# Patient Record
Sex: FEMALE | Race: WHITE | NOT HISPANIC OR LATINO | Employment: FULL TIME | URBAN - METROPOLITAN AREA
[De-identification: names, ages, dates, MRNs, and addresses within clinical notes are randomized per-mention and may not be internally consistent; named-entity substitution may affect disease eponyms.]

---

## 2020-10-14 RX ORDER — LISDEXAMFETAMINE DIMESYLATE 50 MG/1
50 CAPSULE ORAL EVERY MORNING
COMMUNITY
End: 2021-10-21

## 2020-10-14 NOTE — PROGRESS NOTES
CC: Well Woman (age 40+ NOT menopausal)    HPI:  Patient is a 44 y.o.   who presents for her well woman exam today.  History reviewed with patient and changes noted. Patient is without complaints or concerns today.       Past Medical History:   Diagnosis Date    ADD (attention deficit disorder)     Chronic constipation     History of abnormal cervical Pap smear     cryo - normal since    Low vitamin D level        Past Surgical History:   Procedure Laterality Date    CERVIX LESION DESTRUCTION         Social History     Tobacco Use    Smoking status: Never Smoker   Substance Use Topics    Alcohol use: Yes     Alcohol/week: 3.0 standard drinks     Types: 3 Glasses of wine per week     Comment: weekly    Drug use: Never       Family History   Problem Relation Age of Onset    Hypertension Mother     Diabetes Mother     Breast cancer Paternal Aunt        Review of patient's allergies indicates:  No Known Allergies      Current Outpatient Medications:     lisdexamfetamine (VYVANSE) 50 MG capsule, Take 50 mg by mouth every morning., Disp: , Rfl:     OB History        3    Para   3    Term   3            AB        Living   3       SAB        TAB        Ectopic        Multiple        Live Births   3                  GYN HX:    HX ABNL PAPS:  yes- had cryo yrs ago but normal since    HX OF STDS:  none    GARDASIL:  no- declines     CONTRACEPTION:  vasectomy      HEALTH MAINTENANCE:  ([facility])    LAST ANNUAL/ PAP:   2019      LAST PAP:  neg    LAST MMG:  2019  normal at [facility]      LAST LABS: more than a year ago      Review of Systems   Constitutional: Negative for activity change, appetite change, chills, fatigue, fever and unexpected weight change.   Respiratory: Negative for cough and shortness of breath.    Cardiovascular: Negative for chest pain and leg swelling.   Gastrointestinal: Negative for abdominal pain, bloating, blood in  "stool, constipation, diarrhea, nausea and vomiting.   Endocrine: Negative for hair loss and hot flashes.   Genitourinary: Negative for decreased libido, dysmenorrhea, dyspareunia, dysuria, flank pain, frequency, genital sores, hematuria, menorrhagia, pelvic pain, urgency, vaginal bleeding, vaginal discharge, vaginal pain, urinary incontinence, postcoital bleeding and vaginal odor.   Musculoskeletal: Negative for arthralgias and joint swelling.   Integumentary:  Negative for rash, acne, mole/lesion, breast mass, nipple discharge, breast skin changes and breast tenderness.   Neurological: Negative for headaches.   Hematological: Does not bruise/bleed easily.   Psychiatric/Behavioral: Negative for depression and sleep disturbance. The patient is not nervous/anxious.    Breast: Negative for asymmetry, lump, mass, nipple discharge, skin changes and tenderness      VITALS:  Patient's last menstrual period was 09/15/2020.  Vitals:    10/15/20 0847   BP: 101/62   Pulse: 92   Weight: 72.3 kg (159 lb 6.4 oz)   Height: 5' 7" (1.702 m)     Body mass index is 24.97 kg/m².     PHYSICAL EXAM:  Physical Exam:   Constitutional: She is oriented to person, place, and time. She appears well-developed and well-nourished. No distress.    HENT:   Head: Normocephalic and atraumatic.     Neck: No thyroid mass present.    Cardiovascular: Normal rate and normal pulses.     Pulmonary/Chest: Effort normal. No respiratory distress. She exhibits no deformity. Right breast exhibits no inverted nipple, no mass, no nipple discharge, no skin change, no tenderness, no bleeding and no swelling. Left breast exhibits no inverted nipple, no mass, no nipple discharge, no skin change, no tenderness, no bleeding and no swelling. Breasts are symmetrical.        Abdominal: Soft. Normal appearance. She exhibits no distension. There is no abdominal tenderness.     Genitourinary:    Vagina and uterus normal.      Pelvic exam was performed with patient supine. "   There is no rash, tenderness, lesion or injury on the right labia. There is no rash, tenderness, lesion or injury on the left labia. Cervix is normal. Right adnexum displays no mass, no tenderness and no fullness. Left adnexum displays no mass, no tenderness and no fullness. No rectocele, cystocele or unspecified prolapse of vaginal walls in the vagina. Labial bartholins normal.negative for vaginal discharge          Musculoskeletal: Moves all extremeties.       Neurological: She is alert and oriented to person, place, and time.    Skin: Skin is warm and dry. No lesion and no rash noted.    Psychiatric: She has a normal mood and affect. Her speech is normal and behavior is normal. Judgment and thought content normal.     *female chaperone present for exam    ASSESSMENT and PLAN:  Encounter for well woman exam with routine gynecological exam  -     Liquid-based pap smear, screening    Breast cancer screening by mammogram  -     Mammo Digital Screening Bilat w/ Vaughn; Future; Expected date: 11/14/2020         FOLLOWUP:  Follow up in about 1 year (around 10/15/2021).     COUNSELING:  Patient was counseled today on A.C.S. Pap guidelines and recommendations for yearly pelvic exam, monthly self breast exams, contraception, annual mammograms, and screening colonoscopy starting at age 50. Encouraged patient to see her PCP for other health maintenance.

## 2020-10-15 ENCOUNTER — OFFICE VISIT (OUTPATIENT)
Dept: OBSTETRICS AND GYNECOLOGY | Facility: CLINIC | Age: 45
End: 2020-10-15
Payer: COMMERCIAL

## 2020-10-15 VITALS
HEIGHT: 67 IN | BODY MASS INDEX: 25.01 KG/M2 | HEART RATE: 92 BPM | SYSTOLIC BLOOD PRESSURE: 101 MMHG | DIASTOLIC BLOOD PRESSURE: 62 MMHG | WEIGHT: 159.38 LBS

## 2020-10-15 DIAGNOSIS — Z01.419 ENCOUNTER FOR WELL WOMAN EXAM WITH ROUTINE GYNECOLOGICAL EXAM: Primary | ICD-10-CM

## 2020-10-15 DIAGNOSIS — Z12.31 BREAST CANCER SCREENING BY MAMMOGRAM: ICD-10-CM

## 2020-10-15 PROCEDURE — 99396 PREV VISIT EST AGE 40-64: CPT | Mod: S$GLB,,, | Performed by: OBSTETRICS & GYNECOLOGY

## 2020-10-15 PROCEDURE — 99396 PR PREVENTIVE VISIT,EST,40-64: ICD-10-PCS | Mod: S$GLB,,, | Performed by: OBSTETRICS & GYNECOLOGY

## 2020-11-20 ENCOUNTER — TELEPHONE (OUTPATIENT)
Dept: OBSTETRICS AND GYNECOLOGY | Facility: CLINIC | Age: 45
End: 2020-11-20

## 2020-11-20 NOTE — TELEPHONE ENCOUNTER
----- Message from Sayra Mohr sent at 11/20/2020 11:30 AM CST -----  Regarding: pt  Pt would like to speak with Josie. Please call back at [phone]

## 2020-11-22 ENCOUNTER — TELEPHONE (OUTPATIENT)
Dept: OBSTETRICS AND GYNECOLOGY | Facility: CLINIC | Age: 45
End: 2020-11-22

## 2020-11-22 NOTE — TELEPHONE ENCOUNTER
Please call pt and let her know that her pap was normal. The interface to [facility] was down at that time so they were not loaded into her chart yet. We were makinig sure all the ones we sent got results back but hadnt called back normals as they were supposed to be coming thru the interface at some point. But we have manually scanned her report into her chart now so if she goes onto the portal she should be able to see that report if she wants

## 2020-11-23 ENCOUNTER — TELEPHONE (OUTPATIENT)
Dept: OBSTETRICS AND GYNECOLOGY | Facility: CLINIC | Age: 45
End: 2020-11-23

## 2020-11-23 NOTE — TELEPHONE ENCOUNTER
----- Message from Etelvina Wray MD sent at 11/22/2020 10:01 AM CST -----  Regarding: results- nl pap  Please let pat know her pap results are normal

## 2020-12-14 ENCOUNTER — TELEPHONE (OUTPATIENT)
Dept: OBSTETRICS AND GYNECOLOGY | Facility: CLINIC | Age: 45
End: 2020-12-14

## 2020-12-14 DIAGNOSIS — R30.0 DYSURIA: Primary | ICD-10-CM

## 2020-12-14 RX ORDER — NITROFURANTOIN 25; 75 MG/1; MG/1
100 CAPSULE ORAL 2 TIMES DAILY
Qty: 14 CAPSULE | Refills: 0 | Status: SHIPPED | OUTPATIENT
Start: 2020-12-14 | End: 2020-12-21

## 2020-12-14 NOTE — TELEPHONE ENCOUNTER
----- Message from Zena Munoz sent at 12/14/2020 12:32 PM CST -----  Patient need to speak to Josie regarding calling out medication for UTI. Call back number [phone]. Tks

## 2020-12-14 NOTE — TELEPHONE ENCOUNTER
Spoke with patient. Two pt identifiers confirmed. Pt requesting a rx for a uti. Symptoms of frequent ua and burning when emptying. Will send ua ,c&s order to [facility]. [facility] and uses [facility] [address]. Will fwd to Dr. Wray. Check pharmacy later this afternoon. Patient verbalized understanding.

## 2020-12-14 NOTE — TELEPHONE ENCOUNTER
----- Message from Tomas Goff sent at 12/14/2020  2:01 PM CST -----  Regarding: Return call  Type:  Patient Returning Call    Who Called:Teresa  Who Left Message for Patient:Josie  Does the patient know what this is regarding?:na  Would the patient rather a call back or a response via [facility]? Call back  Best Call Back Number:[phone] (home)   Additional Information: margarita

## 2020-12-14 NOTE — TELEPHONE ENCOUNTER
----- Message from Tomas Goff sent at 12/14/2020  2:01 PM CST -----  Regarding: Return call  Type:  Patient Returning Call    Who Called:Teresa  Who Left Message for Patient:Josie  Does the patient know what this is regarding?:na  Would the patient rather a call back or a response via [facility]? Call back  Best Call Back Number:[phone] (home)   Additional Information: margarita

## 2021-10-18 ENCOUNTER — TELEPHONE (OUTPATIENT)
Dept: OBSTETRICS AND GYNECOLOGY | Facility: CLINIC | Age: 46
End: 2021-10-18

## 2021-10-21 ENCOUNTER — OFFICE VISIT (OUTPATIENT)
Dept: OBSTETRICS AND GYNECOLOGY | Facility: CLINIC | Age: 46
End: 2021-10-21
Payer: COMMERCIAL

## 2021-10-21 VITALS
DIASTOLIC BLOOD PRESSURE: 71 MMHG | HEIGHT: 67 IN | HEART RATE: 80 BPM | BODY MASS INDEX: 25.58 KG/M2 | SYSTOLIC BLOOD PRESSURE: 109 MMHG | WEIGHT: 163 LBS

## 2021-10-21 DIAGNOSIS — Z12.31 BREAST CANCER SCREENING BY MAMMOGRAM: Primary | ICD-10-CM

## 2021-10-21 DIAGNOSIS — Z01.419 ENCOUNTER FOR WELL WOMAN EXAM WITH ROUTINE GYNECOLOGICAL EXAM: ICD-10-CM

## 2021-10-21 DIAGNOSIS — Z12.11 SCREENING FOR COLON CANCER: ICD-10-CM

## 2021-10-21 PROCEDURE — 99396 PREV VISIT EST AGE 40-64: CPT | Mod: S$GLB,,, | Performed by: OBSTETRICS & GYNECOLOGY

## 2021-10-21 PROCEDURE — 99396 PR PREVENTIVE VISIT,EST,40-64: ICD-10-PCS | Mod: S$GLB,,, | Performed by: OBSTETRICS & GYNECOLOGY

## 2021-10-28 ENCOUNTER — TELEPHONE (OUTPATIENT)
Dept: OBSTETRICS AND GYNECOLOGY | Facility: CLINIC | Age: 46
End: 2021-10-28
Payer: COMMERCIAL

## 2022-01-06 ENCOUNTER — TELEPHONE (OUTPATIENT)
Dept: GASTROENTEROLOGY | Facility: CLINIC | Age: 47
End: 2022-01-06
Payer: COMMERCIAL

## 2022-10-26 NOTE — PROGRESS NOTES
CC: WELL WOMAN (age 45-59)-perimenopausal  Patient Care Team:  [facility] as PCP - General    HPI:  Patient is a 46 y.o. who presents for her well woman exam today.  History reviewed with patient.   Patient is without complaints or concerns today. Periods still reg q mo    The patient's last visit with me was on 10/21/2021 for wwe    CONTRACEPTION: vasectomy  HX ABNL PAPS: normal after cryo -    REVIEW OF PRIOR DATA/ HEALTH MAINTENANCE:  LAST ANNUAL:   2021   LAST MMG (screening)- 2022- normal at [facility]   LAST LABS- up to date with PCP    LAST COLONOSCOPY- never -declines until age 50- reviewed current guidelines that have have changed to 45-pt aware and will defer till 50     Past Medical History:   Diagnosis Date    ADD (attention deficit disorder)     Chronic constipation     History of abnormal cervical Pap smear     cryo - normal since    Low vitamin D level      SURGICAL HX:   has a past surgical history that includes Cervix lesion destruction ().    SOCIAL HX:    reports that she has never smoked. She has never used smokeless tobacco. She reports current alcohol use of about 3.0 standard drinks per week. She reports that she does not use drugs.    FAMILY HX:   family history includes Breast cancer in her paternal aunt; Diabetes in her mother; Hypertension in her mother. .    ALLERGIES:  Patient has no known allergies.    Current Outpatient Medications:     dextroamphetamine-amphetamine (ADDERALL XR) 30 MG 24 hr capsule, , Disp: , Rfl:     ROS:  CONST:  No fever, chills, fatigue or unexpected changes in weight  CV: No chest pain or palpitations  RESP:  No shortness of breath or cough  GI: No abd pain, vomiting, diarrhea, blood in stool, or changes in bowel mvmts  SKIN: No rashes or lesions  MUSCULOSKELETAL: No joint swelling or pain  PSYCH: No changes in mood or insomia  BREASTS: No asymmetry, lumps, pain, nipple discharge, or skin changes  :  No  "dysuria, urgency, frequency, hematuria or incontinence          No vag dc, itching, odor or dryness          No pelvic pain, dyspareunia, or abnormal vaginal bleeding    VITALS:  Blood pressure 115/83, pulse 71, height 5' 7" (1.702 m), weight 75.3 kg (166 lb), last menstrual period 10/22/2022.  Body mass index is 26 kg/m².     PHYSICAL EXAM-  APPEARANCE: Well appearing, in no acute distress.  NECK: Neck symmetric without masses or thyromegaly.  CV:  Normal rate  PULM: Normal resp rate, no resp distress, normal resp effort  PSYCH: Normal mood and affect, cooperative  SKIN: No rashes, lesions, or abnormal bruising  LYMPH: No inguinal or axillary adenopathy  ABD: Soft, without tenderness or masses. No palpable hernias or hsm  BREAST: Symmetrical, no nipple changes, no skin changes, No palpable masses   PELVIC:  VULVA: No lesions. Normal female genitalia.  URETHRAL MEATUS: No masses, no prolapse.  BLADDER/ URETHRA: No masses or suprapubic tenderness  VAGINA: No lesions or erythema, No discharge.  CVX: No lesions,no cervical motion tenderness.   UTERUS: Normal size/shape, mobile, non-tender  ADNEXA: No masses, tenderness, or fullness.  ANUS/ PERINEUM: Normal tone.  No lesions.    *female chaperone present for entire exam      ASSESSMENT and PLAN:  Encounter for well woman exam with routine gynecological exam  -     Liquid-based pap smear, screening    Breast cancer screening by mammogram  -     Mammo Digital Screening Bilat w/ Vaughn; Future; Expected date: 11/09/2022    Screening for colon cancer     FOLLOWUP:  1 year for wwe or sooner prn    COUNSELING:  Patient was counseled today on recommendation for yearly pelvic exam, current Pap guidelines, self breast exams, contraception, recommendations for annual screening mammograms, and routine screening colonoscopy at age 45.  Encouraged patient to see her PCP for other health maintenance.         "

## 2022-10-27 ENCOUNTER — OFFICE VISIT (OUTPATIENT)
Dept: OBSTETRICS AND GYNECOLOGY | Facility: CLINIC | Age: 47
End: 2022-10-27
Payer: COMMERCIAL

## 2022-10-27 VITALS
DIASTOLIC BLOOD PRESSURE: 83 MMHG | HEIGHT: 67 IN | WEIGHT: 166 LBS | BODY MASS INDEX: 26.06 KG/M2 | HEART RATE: 71 BPM | SYSTOLIC BLOOD PRESSURE: 115 MMHG

## 2022-10-27 DIAGNOSIS — Z12.11 SCREENING FOR COLON CANCER: ICD-10-CM

## 2022-10-27 DIAGNOSIS — Z01.419 ENCOUNTER FOR WELL WOMAN EXAM WITH ROUTINE GYNECOLOGICAL EXAM: Primary | ICD-10-CM

## 2022-10-27 DIAGNOSIS — Z12.31 BREAST CANCER SCREENING BY MAMMOGRAM: ICD-10-CM

## 2022-10-27 PROCEDURE — 99396 PREV VISIT EST AGE 40-64: CPT | Mod: S$GLB,,, | Performed by: OBSTETRICS & GYNECOLOGY

## 2022-10-27 PROCEDURE — 3079F DIAST BP 80-89 MM HG: CPT | Mod: CPTII,S$GLB,, | Performed by: OBSTETRICS & GYNECOLOGY

## 2022-10-27 PROCEDURE — 3074F SYST BP LT 130 MM HG: CPT | Mod: CPTII,S$GLB,, | Performed by: OBSTETRICS & GYNECOLOGY

## 2022-10-27 PROCEDURE — 3074F PR MOST RECENT SYSTOLIC BLOOD PRESSURE < 130 MM HG: ICD-10-PCS | Mod: CPTII,S$GLB,, | Performed by: OBSTETRICS & GYNECOLOGY

## 2022-10-27 PROCEDURE — 1160F PR REVIEW ALL MEDS BY PRESCRIBER/CLIN PHARMACIST DOCUMENTED: ICD-10-PCS | Mod: CPTII,S$GLB,, | Performed by: OBSTETRICS & GYNECOLOGY

## 2022-10-27 PROCEDURE — 3079F PR MOST RECENT DIASTOLIC BLOOD PRESSURE 80-89 MM HG: ICD-10-PCS | Mod: CPTII,S$GLB,, | Performed by: OBSTETRICS & GYNECOLOGY

## 2022-10-27 PROCEDURE — 1159F MED LIST DOCD IN RCRD: CPT | Mod: CPTII,S$GLB,, | Performed by: OBSTETRICS & GYNECOLOGY

## 2022-10-27 PROCEDURE — 1160F RVW MEDS BY RX/DR IN RCRD: CPT | Mod: CPTII,S$GLB,, | Performed by: OBSTETRICS & GYNECOLOGY

## 2022-10-27 PROCEDURE — 99396 PR PREVENTIVE VISIT,EST,40-64: ICD-10-PCS | Mod: S$GLB,,, | Performed by: OBSTETRICS & GYNECOLOGY

## 2022-10-27 PROCEDURE — 1159F PR MEDICATION LIST DOCUMENTED IN MEDICAL RECORD: ICD-10-PCS | Mod: CPTII,S$GLB,, | Performed by: OBSTETRICS & GYNECOLOGY

## 2022-10-31 LAB — Lab: NORMAL

## 2023-09-29 ENCOUNTER — PATIENT MESSAGE (OUTPATIENT)
Dept: OBSTETRICS AND GYNECOLOGY | Facility: CLINIC | Age: 48
End: 2023-09-29
Payer: COMMERCIAL

## 2025-02-25 ENCOUNTER — OFFICE VISIT (OUTPATIENT)
Dept: OBSTETRICS AND GYNECOLOGY | Facility: CLINIC | Age: 50
End: 2025-02-25
Payer: COMMERCIAL

## 2025-02-25 VITALS
SYSTOLIC BLOOD PRESSURE: 110 MMHG | DIASTOLIC BLOOD PRESSURE: 73 MMHG | HEART RATE: 90 BPM | HEIGHT: 67 IN | WEIGHT: 175 LBS | BODY MASS INDEX: 27.47 KG/M2

## 2025-02-25 DIAGNOSIS — Z12.4 ENCOUNTER FOR SCREENING FOR CERVICAL CANCER: ICD-10-CM

## 2025-02-25 DIAGNOSIS — Z12.11 COLON CANCER SCREENING: ICD-10-CM

## 2025-02-25 DIAGNOSIS — Z12.31 SCREENING MAMMOGRAM FOR BREAST CANCER: ICD-10-CM

## 2025-02-25 DIAGNOSIS — Z01.419 GYNECOLOGIC EXAM NORMAL: Primary | ICD-10-CM

## 2025-02-25 PROCEDURE — 3008F BODY MASS INDEX DOCD: CPT | Mod: CPTII,,, | Performed by: NURSE PRACTITIONER

## 2025-02-25 PROCEDURE — 1159F MED LIST DOCD IN RCRD: CPT | Mod: CPTII,,, | Performed by: NURSE PRACTITIONER

## 2025-02-25 PROCEDURE — 99396 PREV VISIT EST AGE 40-64: CPT | Mod: S$PBB,,, | Performed by: NURSE PRACTITIONER

## 2025-02-25 PROCEDURE — 3078F DIAST BP <80 MM HG: CPT | Mod: CPTII,,, | Performed by: NURSE PRACTITIONER

## 2025-02-25 PROCEDURE — 1160F RVW MEDS BY RX/DR IN RCRD: CPT | Mod: CPTII,,, | Performed by: NURSE PRACTITIONER

## 2025-02-25 PROCEDURE — 3074F SYST BP LT 130 MM HG: CPT | Mod: CPTII,,, | Performed by: NURSE PRACTITIONER

## 2025-02-25 NOTE — PROGRESS NOTES
"Subjective     Patient ID: Teresa Velez is a 49 y.o. female.    Chief Complaint:  Well Woman      History of Present Illness  HPI  Annual Exam-Premenopausal  Patient presents for annual exam. The patient has no complaints today. The patient is sexually active. GYN screening history: last pap: was abnormal: ascus neg and last mammogram: was normal. The patient wears seatbelts: yes. The patient participates in regular exercise: yes.     Outpatient Medications Marked as Taking for the 25 encounter (Office Visit) with Kaitlynn Fitzgerald NP   Medication Sig Dispense Refill    dextroamphetamine-amphetamine (ADDERALL XR) 30 MG 24 hr capsule        Vitals:    25 1042   BP: 110/73   Pulse: 90   Weight: 79.4 kg (175 lb)   Height: 5' 7" (1.702 m)     Past Medical History:   Diagnosis Date    ADD (attention deficit disorder)     Chronic constipation     History of abnormal cervical Pap smear     cryo - normal since    Low vitamin D level      Past Surgical History:   Procedure Laterality Date    CERVIX LESION DESTRUCTION           GYN & OB History  Patient's last menstrual period was 2025 (approximate).   Date of Last Pap: 10/31/2022    OB History    Para Term  AB Living   3 3 3   3   SAB IAB Ectopic Multiple Live Births       3      # Outcome Date GA Lbr Wilfrido/2nd Weight Sex Type Anes PTL Lv   3 Term      Vag-Spont   MEGAN   2 Term      Vag-Spont   MEGAN   1 Term      Vag-Spont   MEGAN       Review of Systems  Review of Systems   Constitutional:  Negative for activity change, appetite change, chills, fatigue and fever.   HENT:  Negative for nasal congestion and tinnitus.    Eyes:  Negative for visual disturbance.   Respiratory:  Negative for cough and shortness of breath.    Cardiovascular:  Negative for chest pain and palpitations.   Gastrointestinal:  Negative for abdominal pain, bloating, blood in stool, constipation, nausea and vomiting.   Endocrine: Negative for diabetes, hair loss and " hot flashes.   Genitourinary:  Negative for bladder incontinence, decreased libido, dysmenorrhea, dyspareunia, dysuria, flank pain, frequency, genital sores, hematuria, hot flashes, menorrhagia, menstrual problem, pelvic pain, urgency, vaginal bleeding, vaginal discharge, vaginal pain, urinary incontinence, postcoital bleeding, postmenopausal bleeding, vaginal dryness and vaginal odor.   Musculoskeletal:  Negative for arthralgias, back pain, leg pain and myalgias.   Integumentary:  Negative for rash, acne, hair changes, mole/lesion, breast mass, nipple discharge, breast skin changes and breast tenderness.   Neurological:  Negative for vertigo, syncope, numbness and headaches.   Hematological:  Does not bruise/bleed easily.   Psychiatric/Behavioral:  Negative for depression and sleep disturbance. The patient is not nervous/anxious.    Breast: Negative for asymmetry, lump, mass, mastodynia, nipple discharge, skin changes and tenderness         Objective   Physical Exam:   Constitutional: She is oriented to person, place, and time. She appears well-developed and well-nourished.    HENT:   Head: Normocephalic.   Nose: No epistaxis.      Cardiovascular:  Normal rate, regular rhythm and normal heart sounds.             Pulmonary/Chest: Effort normal and breath sounds normal. Right breast exhibits no inverted nipple, no mass, no nipple discharge, no skin change, no tenderness, no bleeding and no swelling. Left breast exhibits no inverted nipple, no mass, no nipple discharge, no skin change, no tenderness, no bleeding and no swelling.        Abdominal: Soft.     Genitourinary:    Inguinal canal, urethra, bladder, vagina, uterus, right adnexa, left adnexa and rectum normal.      Pelvic exam was performed with patient supine.   The external female genitalia was normal.     Labial bartholins normal.Cervix is normal.    pap smear completedNormal urethral meatus.   Genitourinary Comments: Female chaperone present for exam                Musculoskeletal: Normal range of motion and moves all extremeties.       Neurological: She is alert and oriented to person, place, and time.    Skin: Skin is warm and dry.    Psychiatric: She has a normal mood and affect. Her behavior is normal.            Assessment and Plan     1. Gynecologic exam normal    2. Screening mammogram for breast cancer    3. Encounter for screening for cervical cancer    4. Colon cancer screening             Plan:  Gynecologic exam normal  Patient was counseled today on A.C.S. Pap guidelines and recommendations for yearly pelvic exams, mammograms and monthly self breast exams; to see her PCP for other health maintenance.     Screening mammogram for breast cancer  -     Mammo Digital Screening Bilat w/ Vaughn ([facility]); Future; Expected date: 02/25/2025    Encounter for screening for cervical cancer  -     Liquid-based pap smear, screening    Colon cancer screening  -     Ambulatory referral/consult to Gastroenterology    The following methods for promoting bone health & decreasing the risk for osteoporosis were discussed with the patient:  Engaging in weight bearing exercises most days of the week  Calcium and vitamin D supplementation  Decreasing caffeine intake  Avoiding alcohol consumption  Smoking cessation, if necessary        Follow up in about 1 year (around 2/25/2026).

## 2025-03-04 ENCOUNTER — PATIENT MESSAGE (OUTPATIENT)
Dept: OBSTETRICS AND GYNECOLOGY | Facility: CLINIC | Age: 50
End: 2025-03-04
Payer: COMMERCIAL

## 2025-03-04 ENCOUNTER — RESULTS FOLLOW-UP (OUTPATIENT)
Dept: OBSTETRICS AND GYNECOLOGY | Facility: CLINIC | Age: 50
End: 2025-03-04